# Patient Record
Sex: FEMALE | Race: WHITE | Employment: OTHER | URBAN - METROPOLITAN AREA
[De-identification: names, ages, dates, MRNs, and addresses within clinical notes are randomized per-mention and may not be internally consistent; named-entity substitution may affect disease eponyms.]

---

## 2017-12-27 ENCOUNTER — HOSPITAL ENCOUNTER (OUTPATIENT)
Dept: CT IMAGING | Age: 62
Discharge: HOME | End: 2017-12-27
Payer: MEDICARE

## 2017-12-27 DIAGNOSIS — H47.019: ICD-10-CM

## 2017-12-27 LAB — CREAT BLD-MCNC: 0.9 MG/DL (ref 0.6–1.3)

## 2017-12-27 PROCEDURE — 70496 CT ANGIOGRAPHY HEAD: CPT

## 2017-12-27 PROCEDURE — 74011250636 HC RX REV CODE- 250/636: Performed by: RADIOLOGY

## 2017-12-27 PROCEDURE — 82565 ASSAY OF CREATININE: CPT

## 2017-12-27 PROCEDURE — 74011636320 HC RX REV CODE- 636/320: Performed by: RADIOLOGY

## 2017-12-27 RX ORDER — SODIUM CHLORIDE 9 MG/ML
50 INJECTION, SOLUTION INTRAVENOUS
Status: COMPLETED | OUTPATIENT
Start: 2017-12-27 | End: 2017-12-27

## 2017-12-27 RX ORDER — SODIUM CHLORIDE 0.9 % (FLUSH) 0.9 %
10 SYRINGE (ML) INJECTION
Status: COMPLETED | OUTPATIENT
Start: 2017-12-27 | End: 2017-12-27

## 2017-12-27 RX ADMIN — SODIUM CHLORIDE 50 ML/HR: 900 INJECTION, SOLUTION INTRAVENOUS at 14:33

## 2017-12-27 RX ADMIN — IOPAMIDOL 100 ML: 755 INJECTION, SOLUTION INTRAVENOUS at 14:32

## 2017-12-27 RX ADMIN — Medication 10 ML: at 14:32

## 2023-10-25 ENCOUNTER — HOSPITAL ENCOUNTER (OUTPATIENT)
Facility: HOSPITAL | Age: 68
Discharge: HOME | End: 2023-10-28
Payer: MEDICARE

## 2023-10-25 DIAGNOSIS — F33.2 SEVERE EPISODE OF RECURRENT MAJOR DEPRESSIVE DISORDER, WITHOUT PSYCHOTIC FEATURES (HCC): ICD-10-CM

## 2023-10-25 DIAGNOSIS — G47.00 INSOMNIA DISORDER WITH NON-SLEEP DISORDER MENTAL COMORBIDITY: ICD-10-CM

## 2023-10-25 PROCEDURE — 90792 PSYCH DIAG EVAL W/MED SRVCS: CPT | Performed by: MIDWIFE

## 2023-10-25 RX ORDER — FLUVOXAMINE MALEATE 25 MG
25 TABLET ORAL NIGHTLY
Qty: 30 TABLET | Refills: 0 | Status: SHIPPED | OUTPATIENT
Start: 2023-10-25 | End: 2023-11-24

## 2023-10-25 RX ORDER — TRAZODONE HYDROCHLORIDE 50 MG/1
50 TABLET ORAL NIGHTLY
Qty: 30 TABLET | Refills: 0 | Status: SHIPPED | OUTPATIENT
Start: 2023-10-25 | End: 2023-11-24
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
## 2023-10-30 PROBLEM — Z62.810 HISTORY OF SEXUAL ABUSE IN CHILDHOOD: Status: ACTIVE | Noted: 2023-10-30
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
## 2023-11-21 ENCOUNTER — HOSPITAL ENCOUNTER (OUTPATIENT)
Facility: HOSPITAL | Age: 68
Discharge: HOME | End: 2023-11-24
Payer: MEDICARE

## 2023-11-21 DIAGNOSIS — G47.00 INSOMNIA DISORDER WITH NON-SLEEP DISORDER MENTAL COMORBIDITY: ICD-10-CM

## 2023-11-21 DIAGNOSIS — F33.2 SEVERE EPISODE OF RECURRENT MAJOR DEPRESSIVE DISORDER, WITHOUT PSYCHOTIC FEATURES (HCC): Primary | ICD-10-CM

## 2023-11-21 PROCEDURE — 99214 OFFICE O/P EST MOD 30 MIN: CPT | Performed by: MIDWIFE

## 2023-11-21 RX ORDER — FLUVOXAMINE MALEATE 100 MG
150 TABLET ORAL NIGHTLY
Qty: 45 TABLET | Refills: 0 | Status: SHIPPED | OUTPATIENT
Start: 2023-11-21 | End: 2023-11-22 | Stop reason: SDUPTHER

## 2023-11-21 RX ORDER — TRAZODONE HYDROCHLORIDE 50 MG/1
75 TABLET ORAL NIGHTLY
Qty: 45 TABLET | Refills: 0 | Status: SHIPPED | OUTPATIENT
Start: 2023-11-21 | End: 2023-12-21

## 2023-11-22 RX ORDER — FLUVOXAMINE MALEATE 100 MG
150 TABLET ORAL NIGHTLY
Qty: 135 TABLET | Refills: 0 | Status: SHIPPED | OUTPATIENT
Start: 2023-11-22 | End: 2024-02-20

## 2024-01-02 ENCOUNTER — HOSPITAL ENCOUNTER (OUTPATIENT)
Facility: HOSPITAL | Age: 69
Discharge: HOME | End: 2024-01-05
Payer: MEDICARE

## 2024-01-02 DIAGNOSIS — G47.00 INSOMNIA DISORDER WITH NON-SLEEP DISORDER MENTAL COMORBIDITY: ICD-10-CM

## 2024-01-02 DIAGNOSIS — F33.2 SEVERE EPISODE OF RECURRENT MAJOR DEPRESSIVE DISORDER, WITHOUT PSYCHOTIC FEATURES (HCC): Primary | ICD-10-CM

## 2024-01-02 PROCEDURE — 99214 OFFICE O/P EST MOD 30 MIN: CPT | Performed by: MIDWIFE

## 2024-01-02 RX ORDER — HYDROXYZINE HYDROCHLORIDE 25 MG/1
25 TABLET, FILM COATED ORAL 2 TIMES DAILY PRN
Qty: 60 TABLET | Refills: 0 | Status: SHIPPED | OUTPATIENT
Start: 2024-01-02 | End: 2024-02-01

## 2024-01-02 RX ORDER — FLUVOXAMINE MALEATE 100 MG
150 TABLET ORAL NIGHTLY
Qty: 135 TABLET | Refills: 0 | Status: SHIPPED | OUTPATIENT
Start: 2024-01-02 | End: 2024-04-01

## 2024-01-02 NOTE — BH NOTE
[facility] Outpatient Behavioral [facility]  [facility]  [facility]     Name: Sarah River                                                     MRN:  [id number]           Follow up Medication Management Visit     Time in: 1300     Time out: 1345     Collateral: none     Patient Identification: Sarah River is a 68 year old  female.  for 38 years to Que. Both are retired. Children are 48, 47, 46, and 36. Has five grandchildren. Sarah is retired and on psychiatric disability.      Interim Information: Increases in fluvoxamine were partially helpful; fluvoxamine was increased to 150 mg and trazodone was increased to 75 mg.     Today:      CC: Sarah presents for ongoing medication management for depression and insomnia.     HPI:   Sarah had a difficult time falling asleep until 0700 this morning, without apparent reason. She has no extraordinary stressors. No caffeine yesterday. Had not napped. She felt desperate to fall asleep and took a hydrocodone tablet, which did not help. She stayed in bed and thought about \"regular things\" all night. 's birthday is tomorrow and she was thinking about how to get their son to come over for the celebration. With the recent increase in trazodone, she falls asleep in an hour and feels she has been sleeping well for the most part.    Two nights ago, she had SIs; pictured herself shooting herself but she did not feel intent and did not make a plan. She became teary and felt that \"life sucked.\" The episode lasted an hour and she fell asleep. That was the first occurrence of SIs  in a month. She feels her three dogs and three horses \"are great therapists.\"      REVIEW OF SYSTEMS: Pertinent items are noted in the History of Present Illness. All other Systems reviewed and are considered negative.     Mental Status Examination:     I. Reliability in Providing Information: Good      II. Personal Presentation: Looks stated age; dressed

## 2024-02-05 ENCOUNTER — HOSPITAL ENCOUNTER (OUTPATIENT)
Facility: HOSPITAL | Age: 69
Discharge: HOME | End: 2024-02-08
Payer: MEDICARE

## 2024-02-05 DIAGNOSIS — F33.2 SEVERE EPISODE OF RECURRENT MAJOR DEPRESSIVE DISORDER, WITHOUT PSYCHOTIC FEATURES (HCC): Primary | ICD-10-CM

## 2024-02-05 DIAGNOSIS — Z62.810 HISTORY OF SEXUAL ABUSE IN CHILDHOOD: ICD-10-CM

## 2024-02-05 DIAGNOSIS — G47.00 INSOMNIA DISORDER WITH NON-SLEEP DISORDER MENTAL COMORBIDITY: ICD-10-CM

## 2024-02-05 PROCEDURE — 99214 OFFICE O/P EST MOD 30 MIN: CPT | Performed by: MIDWIFE

## 2024-02-05 RX ORDER — PROPRANOLOL HYDROCHLORIDE 20 MG/1
30 TABLET ORAL 2 TIMES DAILY
COMMUNITY

## 2024-02-05 RX ORDER — HYDROXYZINE HYDROCHLORIDE 10 MG/1
10 TABLET, FILM COATED ORAL EVERY 8 HOURS PRN
Qty: 90 TABLET | Refills: 0 | Status: SHIPPED | OUTPATIENT
Start: 2024-02-05 | End: 2024-03-06

## 2024-02-05 NOTE — BH NOTE
[facility] Outpatient Behavioral [facility]  [facility]  [facility]     Name: Sarah River                                                     MRN:  [id number]           Follow up Medication Management Visit     Time in: 1300     Time out: 1330     Collateral: none     Patient Identification: Sarah River is a 68 year old  female.  for 38 years to Que. Both are retired. Children are 48, 47, 46, and 36. Has five grandchildren. Sarah is retired and on psychiatric disability.      Interim Information: Increases in fluvoxamine were partially helpful; fluvoxamine was increased to 150 mg and trazodone was increased to 75 mg.     Today:      CC: Saarh presents for ongoing medication management for depression and insomnia.     HPI:   Hydroxyzine is helping her sleep and this makes her happy, but she is still tired all day. She is still taking trazodone 75 mg HS. Unsure if her anxiety is better.   Yesterday she tried to go on a walk but she felt queasy, lightheaded, dizzy, sweaty so she turned around and went home. She had eaten a normal breakfast. Was not worried at the time but, again, was tired.  She has not tried hydroxyzine for daytime anxiety \"because I would sleep all day.\" She does think she has SAD, takes Vit D, is aware of the benefit of  being outside. Would rather be at home. When people invite her somewhere, such as a  recent offer of whale watching, she feels panic. She prefers animals in general.  Walking \"is my haven from home, way of leaving the house.\" Has not been riding because she has gained 10 pounds.  Her horse is 13, 1000 pounds, and is sound. Her  is extremely supportive, walks with her, does most of the shopping, as she doesn't like leaving home.     She is taking medication without problems. Takes fluvoxamine 50 mg a.m. and 100 mg p.m., in case taking the 100 in the morning was causing a.m. sedation.    She is not having SIs but sometimes when falling

## 2024-02-15 RX ORDER — TRAZODONE HYDROCHLORIDE 50 MG/1
50 TABLET ORAL NIGHTLY
Qty: 90 TABLET | Refills: 0 | Status: SHIPPED | OUTPATIENT
Start: 2024-02-15 | End: 2024-05-15
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
## 2024-03-05 ENCOUNTER — HOSPITAL ENCOUNTER (OUTPATIENT)
Facility: HOSPITAL | Age: 69
Discharge: HOME | End: 2024-03-08
Payer: MEDICARE

## 2024-03-05 DIAGNOSIS — T74.21XS SEXUAL ASSAULT OF ADULT, SEQUELA: ICD-10-CM

## 2024-03-05 DIAGNOSIS — G47.00 INSOMNIA DISORDER WITH NON-SLEEP DISORDER MENTAL COMORBIDITY: ICD-10-CM

## 2024-03-05 DIAGNOSIS — F33.2 SEVERE EPISODE OF RECURRENT MAJOR DEPRESSIVE DISORDER, WITHOUT PSYCHOTIC FEATURES (HCC): Primary | ICD-10-CM

## 2024-03-05 DIAGNOSIS — Z62.810 HISTORY OF SEXUAL ABUSE IN CHILDHOOD: ICD-10-CM

## 2024-03-05 PROCEDURE — 99214 OFFICE O/P EST MOD 30 MIN: CPT | Performed by: MIDWIFE

## 2024-03-05 RX ORDER — FLUVOXAMINE MALEATE 100 MG
150 TABLET ORAL NIGHTLY
Qty: 135 TABLET | Refills: 0 | Status: SHIPPED | OUTPATIENT
Start: 2024-04-01 | End: 2024-06-30

## 2024-03-05 RX ORDER — HYDROXYZINE HYDROCHLORIDE 10 MG/1
10 TABLET, FILM COATED ORAL EVERY 8 HOURS PRN
Qty: 90 TABLET | Refills: 0 | Status: SHIPPED | OUTPATIENT
Start: 2024-03-05 | End: 2024-04-04

## 2024-04-03 ENCOUNTER — HOSPITAL ENCOUNTER (OUTPATIENT)
Facility: HOSPITAL | Age: 69
Discharge: HOME | End: 2024-04-06
Payer: MEDICARE

## 2024-04-03 DIAGNOSIS — Z62.810 HISTORY OF SEXUAL ABUSE IN CHILDHOOD: ICD-10-CM

## 2024-04-03 DIAGNOSIS — F33.2 SEVERE EPISODE OF RECURRENT MAJOR DEPRESSIVE DISORDER, WITHOUT PSYCHOTIC FEATURES (HCC): Primary | ICD-10-CM

## 2024-04-03 DIAGNOSIS — T74.21XS SEXUAL ASSAULT OF ADULT, SEQUELA: ICD-10-CM

## 2024-04-03 DIAGNOSIS — G47.00 INSOMNIA DISORDER WITH NON-SLEEP DISORDER MENTAL COMORBIDITY: ICD-10-CM

## 2024-04-03 PROCEDURE — 99214 OFFICE O/P EST MOD 30 MIN: CPT | Performed by: MIDWIFE

## 2024-04-03 RX ORDER — FLUVOXAMINE MALEATE 100 MG
100 TABLET ORAL 2 TIMES DAILY
Qty: 60 TABLET | Refills: 0 | Status: SHIPPED | OUTPATIENT
Start: 2024-04-03 | End: 2024-05-03

## 2024-04-03 RX ORDER — TRAZODONE HYDROCHLORIDE 50 MG/1
50 TABLET ORAL NIGHTLY
Qty: 30 TABLET | Refills: 0 | Status: SHIPPED | OUTPATIENT
Start: 2024-04-03 | End: 2024-05-03

## 2024-04-03 RX ORDER — HYDROXYZINE HYDROCHLORIDE 10 MG/1
10 TABLET, FILM COATED ORAL EVERY 8 HOURS PRN
Qty: 90 TABLET | Refills: 0 | Status: SHIPPED | OUTPATIENT
Start: 2024-04-03 | End: 2024-05-03

## 2024-04-03 NOTE — BH NOTE
[facility]  [facility]  [facility]     Name: Sarah River                                                     MRN:  [id number]           Follow up Medication Management Visit     Time in: 1255     Time out: 1330     Collateral: none     Patient Identification: Sarah River is a 69 year old  female.  for 40 years to Que. Both are retired. Three grown children. Has five grandchildren. Sarah is retired and on psychiatric disability.      Interim Information: Increases in fluvoxamine were partially helpful; fluvoxamine was increased to 150 mg and trazodone was increased to 75 mg. Trazodone and hydroxyzine were decreased due to daytime sedation but sleep was suffering so trazodone was increased to 75 mg again.  PCP decreased propranolol due to fatigue  Decrease in day use of hydroxyzine  encouraged to reduce fatigue  Fluvoxamine increased to 200 mg daily     Today:      CC: Sarah presents for ongoing medication management for depression and insomnia.     HPI:     Sleeping better with increase in trazodone, 8 hours a night.      Feels tired \"all the time.\" Is still taking hydroxyzine up to TID including in the mornings. Propranolol BID has also possibly increased fatigue. Motivation and energy are low. Has been sitting and watching TV more than she would like. She has supplies to paint her dog's portrait but cannot get going.      Saw her PCP a couple of days ago, some of her renal values were outside of normal limits. PCP changed propranolol to 1 tab BID and omeprazole to 20 mg.     Still feels panicky often, but she doesn't jump when hearing noises as much as before.     Was initially gung-ho about counseling but couldn't make the phone call, as  \"I cannot tell that story right now.\" Felt like a failure. She has issues with wanting to be perfect. She was the eldest daughter and second child and while there wasn't pressure to be perfect, there were

## 2024-04-30 RX ORDER — FLUVOXAMINE MALEATE 100 MG
100 TABLET ORAL 2 TIMES DAILY
Qty: 180 TABLET | Refills: 0 | Status: SHIPPED | OUTPATIENT
Start: 2024-04-30 | End: 2024-07-29

## 2024-05-07 ENCOUNTER — HOSPITAL ENCOUNTER (OUTPATIENT)
Facility: HOSPITAL | Age: 69
Discharge: HOME | End: 2024-05-10
Payer: MEDICARE

## 2024-05-07 DIAGNOSIS — Z62.810 HISTORY OF SEXUAL ABUSE IN CHILDHOOD: ICD-10-CM

## 2024-05-07 DIAGNOSIS — G47.00 INSOMNIA DISORDER WITH NON-SLEEP DISORDER MENTAL COMORBIDITY: ICD-10-CM

## 2024-05-07 DIAGNOSIS — F33.2 SEVERE EPISODE OF RECURRENT MAJOR DEPRESSIVE DISORDER, WITHOUT PSYCHOTIC FEATURES (HCC): Primary | ICD-10-CM

## 2024-05-07 DIAGNOSIS — T74.21XS SEXUAL ASSAULT OF ADULT, SEQUELA: ICD-10-CM

## 2024-05-07 PROCEDURE — 99213 OFFICE O/P EST LOW 20 MIN: CPT | Performed by: MIDWIFE

## 2024-05-07 RX ORDER — HYDROXYZINE HYDROCHLORIDE 10 MG/1
10 TABLET, FILM COATED ORAL DAILY PRN
Qty: 30 TABLET | Refills: 0 | Status: SHIPPED | OUTPATIENT
Start: 2024-05-07 | End: 2024-06-06

## 2024-05-07 RX ORDER — TRAZODONE HYDROCHLORIDE 50 MG/1
50 TABLET ORAL NIGHTLY
Qty: 30 TABLET | Refills: 2 | Status: SHIPPED | OUTPATIENT
Start: 2024-05-07 | End: 2024-08-05

## 2024-05-07 RX ORDER — FLUVOXAMINE MALEATE 100 MG
100 TABLET ORAL 2 TIMES DAILY
Qty: 180 TABLET | Refills: 0 | Status: SHIPPED | OUTPATIENT
Start: 2024-05-07 | End: 2024-08-05

## 2024-05-07 RX ORDER — HYDROXYZINE HYDROCHLORIDE 10 MG/1
10 TABLET, FILM COATED ORAL DAILY PRN
COMMUNITY
End: 2024-05-07

## 2024-05-07 NOTE — BH NOTE
[facility] Outpatient Behavioral Health  [facility]  [facility] Campuzano [facility]     Name: Sarah River                                                     MRN:  [id number]           Follow up Medication Management Visit     Time in: 1255     Time out: 1330     Collateral: none     Patient Identification: Sarah River is a 69 year old  female.  for 40 years to Que who is a retired . Both are retired. Three grown children. Has five grandchildren. Sarah is retired and on psychiatric disability.      Interim Information: Increases in fluvoxamine were partially helpful; fluvoxamine was increased to 150 mg and trazodone was increased to 75 mg. Trazodone and hydroxyzine were decreased due to daytime sedation but sleep was suffering so trazodone was increased to 75 mg again.  PCP decreased propranolol due to fatigue  Decrease in day use of hydroxyzine  encouraged to reduce fatigue  Fluvoxamine increased to 200 mg daily     Today:      CC: Sarah presents for ongoing medication management for depression and insomnia.     HPI:   Sarah attended a five-day sheep shearing event in [place] at [place] [employer]. In addition to the sheep, she had a cat, dog and other animals to spend time with. This lifted her spirits greatly.     She is sleeping 8 hours a night with trazodone. Her appetite is good and she eats a healthy vegetarian diet.     She has been riding her horse; had a complication when riding bitless and her horse decided to turn around and head home to his barn mate; she seated the gallop and was unhurt.     She increased her fluvoxamine; is doing well with her medications without problems remembering or SEs.      REVIEW OF SYSTEMS: Pertinent items are noted in the History of Present Illness. All other Systems reviewed and are considered negative.     Mental Status Examination:     I. Reliability in Providing Information: Good      II. Personal Presentation: Looks stated age; dressed

## 2024-06-03 RX ORDER — HYDROXYZINE HYDROCHLORIDE 10 MG/1
10 TABLET, FILM COATED ORAL DAILY PRN
Qty: 90 TABLET | Refills: 0 | Status: SHIPPED | OUTPATIENT
Start: 2024-06-03 | End: 2024-09-01

## 2024-06-03 RX ORDER — TRAZODONE HYDROCHLORIDE 50 MG/1
50 TABLET ORAL NIGHTLY
Qty: 90 TABLET | Refills: 0 | Status: SHIPPED | OUTPATIENT
Start: 2024-06-03 | End: 2024-09-01

## 2024-06-03 RX ORDER — HYDROXYZINE HYDROCHLORIDE 10 MG/1
10 TABLET, FILM COATED ORAL DAILY PRN
Qty: 30 TABLET | Refills: 2 | Status: SHIPPED | OUTPATIENT
Start: 2024-06-03 | End: 2024-06-03

## 2024-07-09 ENCOUNTER — HOSPITAL ENCOUNTER (OUTPATIENT)
Facility: HOSPITAL | Age: 69
Discharge: HOME | End: 2024-07-12
Payer: MEDICARE

## 2024-07-09 DIAGNOSIS — F33.2 SEVERE EPISODE OF RECURRENT MAJOR DEPRESSIVE DISORDER, WITHOUT PSYCHOTIC FEATURES (HCC): Primary | ICD-10-CM

## 2024-07-09 DIAGNOSIS — G47.00 INSOMNIA DISORDER WITH NON-SLEEP DISORDER MENTAL COMORBIDITY: ICD-10-CM

## 2024-07-09 PROCEDURE — 99214 OFFICE O/P EST MOD 30 MIN: CPT | Performed by: MIDWIFE

## 2024-07-09 NOTE — BH NOTE
for anxiety or insomnia; end date 6/6 morning and night  Continue trazodone 50-75 mg PRN HS; end date 8/5  RTO 1 month.

## 2024-07-30 RX ORDER — FLUVOXAMINE MALEATE 100 MG
100 TABLET ORAL 2 TIMES DAILY
Qty: 180 TABLET | Refills: 0 | Status: SHIPPED | OUTPATIENT
Start: 2024-07-30 | End: 2024-10-28

## 2024-09-16 RX ORDER — HYDROXYZINE HYDROCHLORIDE 10 MG/1
10 TABLET, FILM COATED ORAL EVERY 8 HOURS PRN
Qty: 90 TABLET | Refills: 2 | Status: SHIPPED | OUTPATIENT
Start: 2024-09-16 | End: 2024-12-15

## 2024-09-16 RX ORDER — TRAZODONE HYDROCHLORIDE 50 MG/1
50 TABLET, FILM COATED ORAL NIGHTLY PRN
Qty: 90 TABLET | Refills: 1 | Status: SHIPPED | OUTPATIENT
Start: 2024-09-16 | End: 2025-03-15
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
## 2024-10-09 ENCOUNTER — HOSPITAL ENCOUNTER (OUTPATIENT)
Facility: HOSPITAL | Age: 69
Discharge: HOME | End: 2024-10-12
Payer: MEDICARE

## 2024-10-09 DIAGNOSIS — G47.00 INSOMNIA DISORDER WITH NON-SLEEP DISORDER MENTAL COMORBIDITY: ICD-10-CM

## 2024-10-09 DIAGNOSIS — T74.21XS SEXUAL ASSAULT OF ADULT, SEQUELA: ICD-10-CM

## 2024-10-09 DIAGNOSIS — Z62.810 HISTORY OF SEXUAL ABUSE IN CHILDHOOD: ICD-10-CM

## 2024-10-09 DIAGNOSIS — F33.2 SEVERE EPISODE OF RECURRENT MAJOR DEPRESSIVE DISORDER, WITHOUT PSYCHOTIC FEATURES (HCC): Primary | ICD-10-CM

## 2024-10-09 PROCEDURE — 99214 OFFICE O/P EST MOD 30 MIN: CPT | Performed by: MIDWIFE

## 2024-10-09 RX ORDER — HYDROXYZINE HYDROCHLORIDE 25 MG/1
25 TABLET, FILM COATED ORAL EVERY 8 HOURS PRN
Qty: 90 TABLET | Refills: 2 | Status: SHIPPED | OUTPATIENT
Start: 2024-10-09 | End: 2025-01-07

## 2024-10-09 RX ORDER — TRAZODONE HYDROCHLORIDE 100 MG/1
100 TABLET ORAL NIGHTLY PRN
Qty: 90 TABLET | Refills: 1 | Status: SHIPPED | OUTPATIENT
Start: 2024-10-09 | End: 2025-04-07

## 2024-10-26 RX ORDER — FLUVOXAMINE MALEATE 100 MG
100 TABLET ORAL 2 TIMES DAILY
Qty: 180 TABLET | Refills: 0 | Status: SHIPPED | OUTPATIENT
Start: 2024-10-26 | End: 2025-01-24

## 2024-11-03 NOTE — BH NOTE
[facility] Outpatient Behavioral Health  [facility]  [facility] Camp[facility]ano [facility]     Name: Sarah River                                                     MRN:  [id number]           Follow up Medication Management Visit     Collateral: none     Patient Identification: Sarah River is a 69 year old  female.  for 40 years to Que who is a retired . Both are retired. Three grown children. Has five grandchildren. Sarah is retired and on psychiatric disability.      Interim Information: Increases in fluvoxamine were partially helpful; fluvoxamine was increased to 150 mg and trazodone was increased to 75 mg. Trazodone and hydroxyzine were decreased due to daytime sedation but sleep was suffering so trazodone was increased to 75 mg again.  PCP decreased propranolol due to fatigue  Decrease in day use of hydroxyzine  encouraged to reduce fatigue  Fluvoxamine increased to 200 mg daily     Today:      CC: Sarah presents for ongoing medication management for depression and insomnia.     HPI:   Sarah is feeling much better. Her  is recovering well from his shoulder injury and surgery and is active at home again. Her PCP started Sarah on levothyroxine 25 mcg to address elevated TSH (>4, at endocrinology office). Her mood is improving, \"but not there yet; I think it's the election wearing be down.\" She anticipates feeling better once it is over. Her anxiety responded well to hydroxyzine. She increased trazodone to 50 mg 1.5 tabs and she is sleeping well for at 7-8 hours, without SEs such as daytime sedation.     She is going to [place] to go to an art tour with her close friend Yuki for a weekend and is very much looking forward to this. She has been enjoying her horses as well.     She has no new concerns.    REVIEW OF SYSTEMS: Pertinent items are noted in the History of Present Illness. All other Systems reviewed and are considered negative.     Mental Status Examination:     I.

## 2024-11-04 ENCOUNTER — HOSPITAL ENCOUNTER (OUTPATIENT)
Facility: HOSPITAL | Age: 69
Discharge: HOME | End: 2024-11-07
Payer: MEDICARE

## 2024-11-04 DIAGNOSIS — F33.2 SEVERE EPISODE OF RECURRENT MAJOR DEPRESSIVE DISORDER, WITHOUT PSYCHOTIC FEATURES (HCC): Primary | ICD-10-CM

## 2024-11-04 DIAGNOSIS — G47.00 INSOMNIA DISORDER WITH NON-SLEEP DISORDER MENTAL COMORBIDITY: ICD-10-CM

## 2024-11-04 DIAGNOSIS — F41.1 GENERALIZED ANXIETY DISORDER: ICD-10-CM

## 2024-11-04 PROCEDURE — 99214 OFFICE O/P EST MOD 30 MIN: CPT | Performed by: MIDWIFE

## 2024-11-04 RX ORDER — TRAZODONE HYDROCHLORIDE 50 MG/1
75 TABLET, FILM COATED ORAL
Qty: 45 TABLET | Refills: 2 | Status: SHIPPED | OUTPATIENT
Start: 2024-11-04 | End: 2025-02-02

## 2024-12-03 RX ORDER — TRAZODONE HYDROCHLORIDE 50 MG/1
75 TABLET, FILM COATED ORAL
Qty: 45 TABLET | Refills: 2 | Status: SHIPPED | OUTPATIENT
Start: 2024-12-03 | End: 2025-03-03

## 2025-01-20 RX ORDER — FLUVOXAMINE MALEATE 100 MG
100 TABLET ORAL 2 TIMES DAILY
Qty: 180 TABLET | Refills: 0 | Status: SHIPPED | OUTPATIENT
Start: 2025-01-20 | End: 2025-04-20

## 2025-01-22 ENCOUNTER — HOSPITAL ENCOUNTER (OUTPATIENT)
Facility: HOSPITAL | Age: 70
Discharge: HOME | End: 2025-01-25
Payer: MEDICARE

## 2025-01-22 DIAGNOSIS — F33.2 SEVERE EPISODE OF RECURRENT MAJOR DEPRESSIVE DISORDER, WITHOUT PSYCHOTIC FEATURES (HCC): Primary | ICD-10-CM

## 2025-01-22 DIAGNOSIS — F41.1 GENERALIZED ANXIETY DISORDER: ICD-10-CM

## 2025-01-22 DIAGNOSIS — G47.00 INSOMNIA DISORDER WITH NON-SLEEP DISORDER MENTAL COMORBIDITY: ICD-10-CM

## 2025-01-22 PROCEDURE — 99214 OFFICE O/P EST MOD 30 MIN: CPT | Performed by: MIDWIFE

## 2025-01-22 RX ORDER — HYDROXYZINE PAMOATE 25 MG/1
25 CAPSULE ORAL 3 TIMES DAILY PRN
Qty: 90 CAPSULE | Refills: 2 | Status: SHIPPED | OUTPATIENT
Start: 2025-01-22 | End: 2025-04-22

## 2025-01-22 NOTE — BH NOTE
[facility] Outpatient Behavioral Health  [facility]  [facility]     Name: Sarah River                                                     MRN:  [id number]           Follow up Medication Management Visit     Collateral: none     Patient Identification: Sarah River is a 69 year old  female.  for 40 years to Que who is a retired . Both are retired. Three grown children. Has five grandchildren. Sarah is retired and on psychiatric disability.      Interim Information: Increases in fluvoxamine were partially helpful; fluvoxamine was increased to 150 mg and trazodone was increased to 75 mg. Trazodone and hydroxyzine were decreased due to daytime sedation but sleep was suffering so trazodone was increased to 75 mg again.  PCP decreased propranolol due to fatigue  Decrease in day use of hydroxyzine  encouraged to reduce fatigue  Fluvoxamine increased to 200 mg daily     Today:      CC: Sarah presents for ongoing medication management for depression and insomnia.     HPI: Sarah slipped in the night when she got up to void her bladder and tripped on one of her dogs. She has a sprain and a tibial plateau fracture; her recovery is expected to take about a month. She does not have a brace and is using a walker. She is taking TYL for pain and a muscle relaxer at night.     His  is doing fairly well with his recovery but as of a recent MRI, he has a tear on the shoulder. They don't know yet what followup he will need.     Her mood has been up and down, due to her recent injury. She has felt her medications are sufficient for her symptoms in general     The muscle relaxer is helping her sleep 10-12 hours a night. She is up to use the bathroom with the use of her walker.     Went to [place] to the [employer] Tour with Yuki Tran ([employer]) and her sister, and had a great time.     REVIEW OF SYSTEMS: Pertinent items are noted in the History of

## 2025-03-25 ENCOUNTER — HOSPITAL ENCOUNTER (OUTPATIENT)
Facility: HOSPITAL | Age: 70
Discharge: HOME | End: 2025-03-28
Payer: MEDICARE

## 2025-03-25 DIAGNOSIS — Z62.810 HISTORY OF SEXUAL ABUSE IN CHILDHOOD: ICD-10-CM

## 2025-03-25 DIAGNOSIS — F33.2 SEVERE EPISODE OF RECURRENT MAJOR DEPRESSIVE DISORDER, WITHOUT PSYCHOTIC FEATURES (HCC): Primary | ICD-10-CM

## 2025-03-25 DIAGNOSIS — T74.21XS SEXUAL ASSAULT OF ADULT, SEQUELA: ICD-10-CM

## 2025-03-25 DIAGNOSIS — G47.00 INSOMNIA DISORDER WITH NON-SLEEP DISORDER MENTAL COMORBIDITY: ICD-10-CM

## 2025-03-25 DIAGNOSIS — F41.1 GENERALIZED ANXIETY DISORDER: ICD-10-CM

## 2025-03-25 PROCEDURE — 99214 OFFICE O/P EST MOD 30 MIN: CPT | Performed by: MIDWIFE

## 2025-03-25 RX ORDER — TRAZODONE HYDROCHLORIDE 50 MG/1
75 TABLET ORAL
Qty: 45 TABLET | Refills: 2 | Status: SHIPPED | OUTPATIENT
Start: 2025-03-25 | End: 2025-06-23

## 2025-03-25 NOTE — BH NOTE
[facility] Outpatient Behavioral Health  [facility]  [facility]ena Campuzano [facility]     Name: Sarah River                                                     MRN:  [id number]           Follow up Medication Management Visit     Collateral: none     Patient Identification: Sarah River is a 69 year old  female.  for 40 years to Que who is a retired . Both are retired. Three grown children. Has five grandchildren. Sarah is retired and on psychiatric disability.      Interim Information: Increases in fluvoxamine were partially helpful; fluvoxamine was increased to 150 mg and trazodone was increased to 75 mg. Trazodone and hydroxyzine were decreased due to daytime sedation but sleep was suffering so trazodone was increased to 75 mg again.  PCP decreased propranolol due to fatigue  Decrease in day use of hydroxyzine  encouraged to reduce fatigue  Fluvoxamine increased to 200 mg daily     Today:      CC: Sarah presents for ongoing medication management for depression and insomnia.     HPI: Sarah has been anxious about recent national and international events; she states she realizes this is situational in nature and that medication changes would not help. She lizet by limiting her exposure to news sources. She is doing other well with her anxiety and depression with her fluvoxamine and propranolol. She is sleeping well with trazodone and hydroxyzine and has no problems with initiation or continuation. She feels rested in the mornings. Her appetite is unchanged. She is happy with her medication regimen and doesn't desire changes.     REVIEW OF SYSTEMS: Pertinent items are noted in the History of Present Illness. All other Systems reviewed and are considered negative.     Mental Status Examination:     I. Reliability in Providing Information: Good      II. Personal Presentation: Looks stated age; dressed appropriately      III. Motor Activity: Using walker      IV. Speech Pattern:

## 2025-04-14 RX ORDER — FLUVOXAMINE MALEATE 100 MG
100 TABLET ORAL 2 TIMES DAILY
Qty: 180 TABLET | Refills: 0 | Status: SHIPPED | OUTPATIENT
Start: 2025-04-18 | End: 2025-07-17

## 2025-04-14 RX ORDER — HYDROXYZINE PAMOATE 25 MG/1
25 CAPSULE ORAL 3 TIMES DAILY PRN
Qty: 90 CAPSULE | Refills: 1 | Status: SHIPPED | OUTPATIENT
Start: 2025-04-18 | End: 2025-10-15

## 2025-05-22 ENCOUNTER — HOSPITAL ENCOUNTER (OUTPATIENT)
Facility: HOSPITAL | Age: 70
Discharge: HOME | End: 2025-05-25
Payer: MEDICARE

## 2025-05-22 DIAGNOSIS — F33.2 SEVERE EPISODE OF RECURRENT MAJOR DEPRESSIVE DISORDER, WITHOUT PSYCHOTIC FEATURES (HCC): Primary | ICD-10-CM

## 2025-05-22 DIAGNOSIS — G47.00 INSOMNIA DISORDER WITH NON-SLEEP DISORDER MENTAL COMORBIDITY: ICD-10-CM

## 2025-05-22 PROCEDURE — 99214 OFFICE O/P EST MOD 30 MIN: CPT | Performed by: MIDWIFE

## 2025-05-22 NOTE — BH NOTE
[facility]  [facility]  [facility] Pham     Name: Sarah River                                                     MRN:  [id number]           Follow up Medication Management Visit     Collateral: none     Patient Identification: Sarah River is a 69 year old  female.  for 40 years to Que who is a retired . Both are retired. Three grown children. Has five grandchildren. Sarah is retired and on psychiatric disability.      Interim Information: Increases in fluvoxamine were partially helpful; fluvoxamine was increased to 150 mg and trazodone was increased to 75 mg. Trazodone and hydroxyzine were decreased due to daytime sedation but sleep was suffering so trazodone was increased to 75 mg again.  PCP decreased propranolol due to fatigue  Decrease in day use of hydroxyzine  encouraged to reduce fatigue  Fluvoxamine increased to 200 mg daily     Today:      CC: Sarah presents for ongoing medication management for depression and insomnia.     HPI: Sarah reports she has been diagnosed with CKD 3A after abnormal labs and symptoms for years. She has been in a daze for days and began to get angry after her appt. She has suspected this and had been asking questions about the labs. She has lost confidence in her PCP and is thinking of switching to another.  Has an appt with a nephrologist at [facility] and needs a dietary consult. Dietary changes seem to be the main treatment.She is a vegetarian and drinks only water, she she feels this is fortunate. Her  has been supportive of her.     She sleeps well unless something upsets her in the evening, then she may not sleep at all. In general, she gets about 10-11 hours a night. Wakes with good energy. She now knows CKG may cause her to need more sleep due to the fatigue of the illness. She is reducing trazodone use to 50 mg PRN HS, as this is the one medication that may negatively affect CKD, per her

## 2025-07-28 ENCOUNTER — HOSPITAL ENCOUNTER (OUTPATIENT)
Facility: HOSPITAL | Age: 70
Discharge: HOME | End: 2025-07-31
Payer: MEDICARE

## 2025-07-28 DIAGNOSIS — G47.00 INSOMNIA DISORDER WITH NON-SLEEP DISORDER MENTAL COMORBIDITY: ICD-10-CM

## 2025-07-28 DIAGNOSIS — F33.2 SEVERE EPISODE OF RECURRENT MAJOR DEPRESSIVE DISORDER, WITHOUT PSYCHOTIC FEATURES (HCC): Primary | ICD-10-CM

## 2025-07-28 DIAGNOSIS — F41.1 GENERALIZED ANXIETY DISORDER: ICD-10-CM

## 2025-07-28 PROCEDURE — 99214 OFFICE O/P EST MOD 30 MIN: CPT | Performed by: MIDWIFE

## 2025-07-28 RX ORDER — HYDROXYZINE PAMOATE 25 MG/1
25 CAPSULE ORAL 3 TIMES DAILY PRN
Qty: 90 CAPSULE | Refills: 1 | Status: SHIPPED | OUTPATIENT
Start: 2025-07-28 | End: 2026-01-24

## 2025-07-28 RX ORDER — TRAZODONE HYDROCHLORIDE 50 MG/1
75 TABLET ORAL
Qty: 45 TABLET | Refills: 2 | Status: SHIPPED | OUTPATIENT
Start: 2025-07-28 | End: 2025-10-26

## 2025-07-28 RX ORDER — FLUVOXAMINE MALEATE 100 MG
100 TABLET ORAL 2 TIMES DAILY
Qty: 180 TABLET | Refills: 0 | Status: SHIPPED | OUTPATIENT
Start: 2025-07-28 | End: 2025-10-26

## 2025-07-28 NOTE — BH NOTE
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  to [place], who is a retired . She has three grown children and five grandchildren.      Interim Information: Increases in fluvoxamine were partially helpful; fluvoxamine was increased to 150 mg and trazodone was increased to 75 mg. Trazodone and hydroxyzine were decreased due to daytime sedation but sleep was suffering so trazodone was increased to 75 mg again.  PCP decreased propranolol due to fatigue  Decrease in day use of hydroxyzine  encouraged to reduce fatigue  Fluvoxamine increased to 200 mg daily     Today:      CC: Sarah presents for ongoing medication management for anxiety, depression, and insomnia.     HPI:   PHQ9 = 11  GAD7 = 6    Sarah reports she is doing well. She is still in the process of integrating her diagnosis of CKD. Has made positive changes in her diet and is more active now that her injury is healed, and she has lost 15 pounds. She has not had undue symptoms of depression although she finds the current political climate upsetting. In response, she has been moderating the time spent paying attention to national news. Her anxiety is improved overall, although she has been out of hydroxyzine for almost a week, so has been more nervous lately. The pharmacy denied refills and she hasn't called the office, as she isn't one to  the phone.     She is sleeping well. Is using THC in a vape pen specifically at , which is very helpful. Her appetite is normal.      REVIEW OF SYSTEMS: Pertinent items are noted in the History of Present Illness. All other Systems reviewed and are considered negative.     Mental Status